# Patient Record
Sex: MALE | Race: WHITE | Employment: FULL TIME | URBAN - METROPOLITAN AREA
[De-identification: names, ages, dates, MRNs, and addresses within clinical notes are randomized per-mention and may not be internally consistent; named-entity substitution may affect disease eponyms.]

---

## 2019-04-16 ENCOUNTER — HOSPITAL ENCOUNTER (OUTPATIENT)
Age: 54
Discharge: HOME | End: 2019-04-16
Payer: COMMERCIAL

## 2019-04-16 LAB
ALBUMIN SERPL-MCNC: 4.1 G/DL (ref 3.5–5.2)
ALP BLD-CCNC: 53 U/L (ref 40–129)
ALT SERPL-CCNC: 18 U/L (ref 0–40)
ANION GAP SERPL CALCULATED.3IONS-SCNC: 10 MMOL/L (ref 7–16)
AST SERPL-CCNC: 22 U/L (ref 0–39)
BASOPHILS ABSOLUTE: 0.05 E9/L (ref 0–0.2)
BASOPHILS RELATIVE PERCENT: 1.1 % (ref 0–2)
BILIRUB SERPL-MCNC: 0.7 MG/DL (ref 0–1.2)
BUN BLDV-MCNC: 20 MG/DL (ref 6–20)
CALCIUM SERPL-MCNC: 9.4 MG/DL (ref 8.6–10.2)
CEA: 1.9 NG/ML (ref 0–5.2)
CHLORIDE BLD-SCNC: 110 MMOL/L (ref 98–107)
CHOLESTEROL, FASTING: 211 MG/DL (ref 0–199)
CO2: 26 MMOL/L (ref 22–29)
CREAT SERPL-MCNC: 1.1 MG/DL (ref 0.7–1.2)
EOSINOPHILS ABSOLUTE: 0.19 E9/L (ref 0.05–0.5)
EOSINOPHILS RELATIVE PERCENT: 4.3 % (ref 0–6)
GFR AFRICAN AMERICAN: >60
GFR NON-AFRICAN AMERICAN: >60 ML/MIN/1.73
GLUCOSE FASTING: 104 MG/DL (ref 74–99)
HCT VFR BLD CALC: 45.1 % (ref 37–54)
HDLC SERPL-MCNC: 70 MG/DL
HEMOGLOBIN: 14.8 G/DL (ref 12.5–16.5)
IMMATURE GRANULOCYTES #: 0.02 E9/L
IMMATURE GRANULOCYTES %: 0.4 % (ref 0–5)
LDL CHOLESTEROL CALCULATED: 123 MG/DL (ref 0–99)
LYMPHOCYTES ABSOLUTE: 1.72 E9/L (ref 1.5–4)
LYMPHOCYTES RELATIVE PERCENT: 38.5 % (ref 20–42)
MCH RBC QN AUTO: 31.4 PG (ref 26–35)
MCHC RBC AUTO-ENTMCNC: 32.8 % (ref 32–34.5)
MCV RBC AUTO: 95.8 FL (ref 80–99.9)
MONOCYTES ABSOLUTE: 0.61 E9/L (ref 0.1–0.95)
MONOCYTES RELATIVE PERCENT: 13.6 % (ref 2–12)
NEUTROPHILS ABSOLUTE: 1.88 E9/L (ref 1.8–7.3)
NEUTROPHILS RELATIVE PERCENT: 42.1 % (ref 43–80)
PDW BLD-RTO: 14 FL (ref 11.5–15)
PLATELET # BLD: 197 E9/L (ref 130–450)
PMV BLD AUTO: 9.8 FL (ref 7–12)
POTASSIUM SERPL-SCNC: 4.6 MMOL/L (ref 3.5–5)
PROSTATE SPECIFIC ANTIGEN: 0.34 NG/ML (ref 0–4)
RBC # BLD: 4.71 E12/L (ref 3.8–5.8)
SODIUM BLD-SCNC: 146 MMOL/L (ref 132–146)
T4 TOTAL: 4.3 MCG/DL (ref 4.5–11.7)
TESTOSTERONE TOTAL: 362.8 NG/DL
TOTAL PROTEIN: 6.4 G/DL (ref 6.4–8.3)
TRIGLYCERIDE, FASTING: 91 MG/DL (ref 0–149)
TSH SERPL DL<=0.05 MIU/L-ACNC: 3.25 UIU/ML (ref 0.27–4.2)
VLDLC SERPL CALC-MCNC: 18 MG/DL
WBC # BLD: 4.5 E9/L (ref 4.5–11.5)

## 2019-04-16 PROCEDURE — 80053 COMPREHEN METABOLIC PANEL: CPT

## 2019-04-16 PROCEDURE — 84436 ASSAY OF TOTAL THYROXINE: CPT

## 2019-04-16 PROCEDURE — 85025 COMPLETE CBC W/AUTO DIFF WBC: CPT

## 2019-04-16 PROCEDURE — G0103 PSA SCREENING: HCPCS

## 2019-04-16 PROCEDURE — 84403 ASSAY OF TOTAL TESTOSTERONE: CPT

## 2019-04-16 PROCEDURE — 82378 CARCINOEMBRYONIC ANTIGEN: CPT

## 2019-04-16 PROCEDURE — 80061 LIPID PANEL: CPT

## 2019-04-16 PROCEDURE — 84443 ASSAY THYROID STIM HORMONE: CPT

## 2019-04-16 PROCEDURE — 36415 COLL VENOUS BLD VENIPUNCTURE: CPT

## 2019-04-23 ENCOUNTER — TELEPHONE (OUTPATIENT)
Dept: CARDIOLOGY CLINIC | Age: 54
End: 2019-04-23

## 2019-05-03 ENCOUNTER — OFFICE VISIT (OUTPATIENT)
Dept: CARDIOLOGY CLINIC | Age: 54
End: 2019-05-03
Payer: COMMERCIAL

## 2019-05-03 VITALS
DIASTOLIC BLOOD PRESSURE: 70 MMHG | SYSTOLIC BLOOD PRESSURE: 110 MMHG | HEIGHT: 74 IN | WEIGHT: 220 LBS | HEART RATE: 60 BPM | BODY MASS INDEX: 28.23 KG/M2

## 2019-05-03 DIAGNOSIS — Z82.49 FAMILY HISTORY OF EARLY CAD: ICD-10-CM

## 2019-05-03 DIAGNOSIS — E66.3 OVER WEIGHT: ICD-10-CM

## 2019-05-03 DIAGNOSIS — Z72.0 TOBACCO ABUSE: ICD-10-CM

## 2019-05-03 DIAGNOSIS — I10 ESSENTIAL HYPERTENSION: ICD-10-CM

## 2019-05-03 DIAGNOSIS — R07.9 CHEST PAIN, UNSPECIFIED TYPE: Primary | ICD-10-CM

## 2019-05-03 PROCEDURE — 99204 OFFICE O/P NEW MOD 45 MIN: CPT | Performed by: INTERNAL MEDICINE

## 2019-05-03 PROCEDURE — 93000 ELECTROCARDIOGRAM COMPLETE: CPT | Performed by: INTERNAL MEDICINE

## 2019-05-03 RX ORDER — ESCITALOPRAM OXALATE 20 MG/1
20 TABLET ORAL DAILY
Refills: 1 | COMMUNITY
Start: 2019-03-31

## 2019-05-03 NOTE — PROGRESS NOTES
OFFICE VISIT     PRIMARY CARE PHYSICIAN:      Pricila Matias DO       ALLERGIES / SENSITIVITIES:        Allergies   Allergen Reactions    Bee Venom           REVIEWED MEDICATIONS:        Current Outpatient Medications:     escitalopram (LEXAPRO) 20 MG tablet, Take 20 mg by mouth daily , Disp: , Rfl: 1    olmesartan (BENICAR) 20 MG tablet, Take 40 mg by mouth daily , Disp: , Rfl:       S: REASON FOR VISIT:       Chief Complaint   Patient presents with    Chest Pain     Consult per Dr. Arturo Paul due to Chest pain. Pt complains chest pain was after eating. No other cardiac complaints today          History of Present Illness:       New consult for chest apin   C/o on and off lower sternal pain, after eating ' like disconfort\", no radiation, no associated Sx, relieve on its own   No hospitalizations or surgeries recently   Yunier any exertional chest pain or short of breath   No palpitations, dizzy or syncope.    Active at home   No orthopnea   Try to watch diet   Compliant with all medications       Past Medical History:   Diagnosis Date    Hypertension     Kidney stones             Past Surgical History:   Procedure Laterality Date    KIDNEY STONE SURGERY            Family History   Problem Relation Age of Onset    Cancer Mother     Heart Attack Father           Social History     Tobacco Use    Smoking status: Current Every Day Smoker     Packs/day: 1.00    Smokeless tobacco: Never Used   Substance Use Topics    Alcohol use: No         Review of Systems:  HEENT: negative for acute visual symptoms or auditory problems, no dysphagia  Constitutional: negative for fever and chills, or significant weight loss  Respiratory: negative for cough, wheezing, or hemoptysis  Cardiovascular: negative for chest pain, palpitations, and dyspnea  Gastrointestinal: negative for abdominal pain, diarrhea, nausea and vomiting  Endocrine: Negative for polyuria and polydyspsia  Genitourinary:negative for dysuria and hematuria  Derm: negative for rash and skin lesion(s)  Neurological: negative for tingling, numbness, weakness, seizures and tremors  Endocrine: negative for polydipsia and polyuria  Musculoskeletal: negative for pain or tenderness  Psychiatric: negative for anxiety, depression, or suicidal ideations         O:  COMPLETE PHYSICAL EXAM:       /70   Pulse 60   Ht 6' 2\" (1.88 m)   Wt 220 lb (99.8 kg)   BMI 28.25 kg/m²       General:   Patient alert, comfortable, no distress. Appears stated age. HEENT:    Pupils equal, no icterus, no nasal drainage, tongue moist.   Neck:              No masses, Thyroid not palpable. Chest:   Normal configuration, non tender. Lungs:   Clear to auscultation bilaterally, few scattered rhonchi. Cardiovascular:  Regular rhythm, 1/6 systolic murmur, No S3, PMI at 5th ICS, no palpable thrills, No elevated JVD, No carotid bruit. Abdomen:  Soft, Non tender, Bowel sounds normal, no pulsatile abdominal aorta, no palpable masses. Extremities:  No edema. Distal pulses palpable. No cyanosis, no clubbing. Skin:   Good turgor, warm and dry, no cyanosis. Musculoskeletal: No joint swelling or deformity. Neuro:   Cranial nerves grossly intact; No focal neurologic deficit. Psych:   Alert, good mood and effect. REVIEW OF DIAGNOSTIC TESTS:        Electrocardiogram: Reviewed             A/P:   ASSESSMENT / PLAN:    Natalie Ryan was seen today for chest pain. Diagnoses and all orders for this visit:    Chest pain, unspecified type - Atypical  -     EKG 12 Lead  -     CARDIAC STRESS TEST EXERCISE ONLY; Future    Over weight - Diet, exercise and weight loss discussed. Essential hypertension - Stable  -     CARDIAC STRESS TEST EXERCISE ONLY; Future    Family history of early CAD  -     CARDIAC STRESS TEST EXERCISE ONLY; Future    Tobacco use - Counseled to quit smoking     Preventive Cardiology: Low cholesterol diet, regular exercise as tolerate, and gradual weight loss discussed.      Monitor BP and heart rates. D/w his wife   All questions answered about cardiac diagnoses and cardiac medications. Continue current medications. Compliance with medications and f/u with all physicians discussed. Risk factor modification based on risk profile discussed. Call if any exertional chest pain, short of breath, dizzy or palpitations   Follow up in 12months or earlier if needed.          [facility]  [address], [place], [address] [facility]  [phone]

## 2019-05-21 ENCOUNTER — HOSPITAL ENCOUNTER (OUTPATIENT)
Dept: CARDIOLOGY | Age: 54
Discharge: HOME | End: 2019-05-21
Payer: COMMERCIAL

## 2019-05-21 VITALS
DIASTOLIC BLOOD PRESSURE: 60 MMHG | OXYGEN SATURATION: 99 % | SYSTOLIC BLOOD PRESSURE: 130 MMHG | HEIGHT: 74 IN | HEART RATE: 86 BPM | WEIGHT: 222 LBS | BODY MASS INDEX: 28.49 KG/M2

## 2019-05-21 DIAGNOSIS — R07.9 CHEST PAIN, UNSPECIFIED TYPE: ICD-10-CM

## 2019-05-21 DIAGNOSIS — Z82.49 FAMILY HISTORY OF EARLY CAD: ICD-10-CM

## 2019-05-21 DIAGNOSIS — I10 ESSENTIAL HYPERTENSION: ICD-10-CM

## 2019-05-21 PROCEDURE — 93017 CV STRESS TEST TRACING ONLY: CPT

## 2020-06-03 ENCOUNTER — TELEPHONE (OUTPATIENT)
Dept: CARDIOLOGY CLINIC | Age: 55
End: 2020-06-03

## 2022-07-16 ENCOUNTER — HOSPITAL ENCOUNTER (OUTPATIENT)
Age: 57
Discharge: HOME | End: 2022-07-16
Payer: COMMERCIAL

## 2022-07-16 LAB
ALBUMIN SERPL-MCNC: 4.6 G/DL (ref 3.5–5.2)
ALP BLD-CCNC: 68 U/L (ref 40–129)
ALT SERPL-CCNC: 19 U/L (ref 0–40)
ANION GAP SERPL CALCULATED.3IONS-SCNC: 9 MMOL/L (ref 7–16)
AST SERPL-CCNC: 17 U/L (ref 0–39)
BASOPHILS ABSOLUTE: 0.06 E9/L (ref 0–0.2)
BASOPHILS RELATIVE PERCENT: 1.2 % (ref 0–2)
BILIRUB SERPL-MCNC: 0.8 MG/DL (ref 0–1.2)
BUN BLDV-MCNC: 22 MG/DL (ref 6–20)
CALCIUM SERPL-MCNC: 9.2 MG/DL (ref 8.6–10.2)
CEA: 2.4 NG/ML (ref 0–5.2)
CHLORIDE BLD-SCNC: 103 MMOL/L (ref 98–107)
CHOLESTEROL, TOTAL: 229 MG/DL (ref 0–199)
CO2: 25 MMOL/L (ref 22–29)
CREAT SERPL-MCNC: 1.1 MG/DL (ref 0.7–1.2)
EOSINOPHILS ABSOLUTE: 0.18 E9/L (ref 0.05–0.5)
EOSINOPHILS RELATIVE PERCENT: 3.7 % (ref 0–6)
GFR AFRICAN AMERICAN: >60
GFR NON-AFRICAN AMERICAN: >60 ML/MIN/1.73
GLUCOSE BLD-MCNC: 99 MG/DL (ref 74–99)
HCT VFR BLD CALC: 48.5 % (ref 37–54)
HDLC SERPL-MCNC: 65 MG/DL
HEMOGLOBIN: 15.9 G/DL (ref 12.5–16.5)
IMMATURE GRANULOCYTES #: 0.02 E9/L
IMMATURE GRANULOCYTES %: 0.4 % (ref 0–5)
LDL CHOLESTEROL CALCULATED: 139 MG/DL (ref 0–99)
LYMPHOCYTES ABSOLUTE: 1.8 E9/L (ref 1.5–4)
LYMPHOCYTES RELATIVE PERCENT: 37.4 % (ref 20–42)
MCH RBC QN AUTO: 31.4 PG (ref 26–35)
MCHC RBC AUTO-ENTMCNC: 32.8 % (ref 32–34.5)
MCV RBC AUTO: 95.7 FL (ref 80–99.9)
MONOCYTES ABSOLUTE: 0.55 E9/L (ref 0.1–0.95)
MONOCYTES RELATIVE PERCENT: 11.4 % (ref 2–12)
NEUTROPHILS ABSOLUTE: 2.2 E9/L (ref 1.8–7.3)
NEUTROPHILS RELATIVE PERCENT: 45.9 % (ref 43–80)
PDW BLD-RTO: 13.8 FL (ref 11.5–15)
PLATELET # BLD: 209 E9/L (ref 130–450)
PMV BLD AUTO: 9.5 FL (ref 7–12)
POTASSIUM SERPL-SCNC: 4 MMOL/L (ref 3.5–5)
PROSTATE SPECIFIC ANTIGEN: 0.31 NG/ML (ref 0–4)
RBC # BLD: 5.07 E12/L (ref 3.8–5.8)
SODIUM BLD-SCNC: 137 MMOL/L (ref 132–146)
T4 TOTAL: 4.7 MCG/DL (ref 4.5–11.7)
TOTAL PROTEIN: 7.2 G/DL (ref 6.4–8.3)
TRIGL SERPL-MCNC: 126 MG/DL (ref 0–149)
TSH SERPL DL<=0.05 MIU/L-ACNC: 1.59 UIU/ML (ref 0.27–4.2)
VLDLC SERPL CALC-MCNC: 25 MG/DL
WBC # BLD: 4.8 E9/L (ref 4.5–11.5)

## 2022-07-16 PROCEDURE — 84443 ASSAY THYROID STIM HORMONE: CPT

## 2022-07-16 PROCEDURE — 82378 CARCINOEMBRYONIC ANTIGEN: CPT

## 2022-07-16 PROCEDURE — 85025 COMPLETE CBC W/AUTO DIFF WBC: CPT

## 2022-07-16 PROCEDURE — 80061 LIPID PANEL: CPT

## 2022-07-16 PROCEDURE — 80053 COMPREHEN METABOLIC PANEL: CPT

## 2022-07-16 PROCEDURE — 84436 ASSAY OF TOTAL THYROXINE: CPT

## 2022-07-16 PROCEDURE — 36415 COLL VENOUS BLD VENIPUNCTURE: CPT

## 2022-07-16 PROCEDURE — G0103 PSA SCREENING: HCPCS

## 2023-11-30 ENCOUNTER — TRANSCRIBE ORDERS (OUTPATIENT)
Age: 58
End: 2023-11-30

## 2023-11-30 DIAGNOSIS — R01.1 HEART MURMUR: Primary | ICD-10-CM

## 2023-12-01 ENCOUNTER — HOSPITAL ENCOUNTER (OUTPATIENT)
Age: 58
Discharge: HOME | End: 2023-12-01
Payer: COMMERCIAL

## 2023-12-01 VITALS — BODY MASS INDEX: 28.49 KG/M2 | HEIGHT: 74 IN | WEIGHT: 222 LBS

## 2023-12-01 DIAGNOSIS — R01.1 HEART MURMUR: ICD-10-CM

## 2023-12-01 LAB
CHOLEST SERPL-MCNC: 147 MG/DL
HDLC SERPL-MCNC: 73 MG/DL
LDLC SERPL CALC-MCNC: 58 MG/DL
TRIGL SERPL-MCNC: 78 MG/DL
VLDLC SERPL CALC-MCNC: 16 MG/DL

## 2023-12-01 PROCEDURE — 36415 COLL VENOUS BLD VENIPUNCTURE: CPT

## 2023-12-01 PROCEDURE — 93306 TTE W/DOPPLER COMPLETE: CPT

## 2023-12-01 PROCEDURE — 80061 LIPID PANEL: CPT

## 2023-12-02 LAB
ECHO AR MAX VEL PISA: 3 M/S
ECHO AV AREA PEAK VELOCITY: 2.9 CM2
ECHO AV AREA PEAK VELOCITY: 3 CM2
ECHO AV AREA PEAK VELOCITY: 3.7 CM2
ECHO AV AREA PEAK VELOCITY: 3.8 CM2
ECHO AV AREA VTI: 3.3 CM2
ECHO AV AREA/BSA VTI: 1.5 CM2/M2
ECHO AV CUSP MM: 3 CM
ECHO AV MEAN GRADIENT: 6 MMHG
ECHO AV MEAN VELOCITY: 1.2 M/S
ECHO AV PEAK GRADIENT: 10 MMHG
ECHO AV PEAK GRADIENT: 11 MMHG
ECHO AV PEAK VELOCITY: 1.6 M/S
ECHO AV PEAK VELOCITY: 1.6 M/S
ECHO AV REGURGITANT PHT: 1294.7 MILLISECOND
ECHO AV VTI: 36.7 CM
ECHO BSA: 2.29 M2
ECHO EST RA PRESSURE: 3 MMHG
ECHO LA DIAMETER INDEX: 1.63 CM/M2
ECHO LA DIAMETER: 3.7 CM
ECHO LA VOL A-L A2C: 55 ML (ref 18–58)
ECHO LA VOL A-L A4C: 57 ML (ref 18–58)
ECHO LA VOL MOD A2C: 52 ML (ref 18–58)
ECHO LA VOL MOD A4C: 53 ML (ref 18–58)
ECHO LA VOLUME AREA LENGTH: 62 ML
ECHO LA VOLUME INDEX A-L A2C: 24 ML/M2 (ref 16–34)
ECHO LA VOLUME INDEX A-L A4C: 25 ML/M2 (ref 16–34)
ECHO LA VOLUME INDEX AREA LENGTH: 27 ML/M2 (ref 16–34)
ECHO LA VOLUME INDEX MOD A2C: 23 ML/M2 (ref 16–34)
ECHO LA VOLUME INDEX MOD A4C: 23 ML/M2 (ref 16–34)
ECHO LV FRACTIONAL SHORTENING: 42 % (ref 28–44)
ECHO LV INTERNAL DIMENSION DIASTOLE INDEX: 2.29 CM/M2
ECHO LV INTERNAL DIMENSION DIASTOLIC: 5.2 CM (ref 4.2–5.9)
ECHO LV INTERNAL DIMENSION SYSTOLIC INDEX: 1.32 CM/M2
ECHO LV INTERNAL DIMENSION SYSTOLIC: 3 CM
ECHO LV IVSD: 1.5 CM (ref 0.6–1)
ECHO LV IVSS: 1.8 CM
ECHO LV MASS 2D: 342.4 G (ref 88–224)
ECHO LV MASS INDEX 2D: 150.8 G/M2 (ref 49–115)
ECHO LV POSTERIOR WALL DIASTOLIC: 1.5 CM (ref 0.6–1)
ECHO LV POSTERIOR WALL SYSTOLIC: 1.6 CM
ECHO LV RELATIVE WALL THICKNESS RATIO: 0.58
ECHO LVOT AREA: 3.1 CM2
ECHO LVOT AV VTI INDEX: 1.06
ECHO LVOT DIAM: 2 CM
ECHO LVOT MEAN GRADIENT: 9 MMHG
ECHO LVOT PEAK GRADIENT: 15 MMHG
ECHO LVOT PEAK GRADIENT: 9 MMHG
ECHO LVOT PEAK VELOCITY: 1.5 M/S
ECHO LVOT PEAK VELOCITY: 1.9 M/S
ECHO LVOT STROKE VOLUME INDEX: 53.8 ML/M2
ECHO LVOT SV: 122.1 ML
ECHO LVOT VTI: 38.9 CM
ECHO MV "A" WAVE DURATION: 175.3 MSEC
ECHO MV A VELOCITY: 0.66 M/S
ECHO MV AREA PHT: 2 CM2
ECHO MV AREA VTI: 5.6 CM2
ECHO MV E DECELERATION TIME (DT): 286.5 MS
ECHO MV E VELOCITY: 0.49 M/S
ECHO MV E/A RATIO: 0.74
ECHO MV LVOT VTI INDEX: 0.57
ECHO MV MAX VELOCITY: 0.7 M/S
ECHO MV MEAN GRADIENT: 1 MMHG
ECHO MV MEAN VELOCITY: 0.4 M/S
ECHO MV PEAK GRADIENT: 2 MMHG
ECHO MV PRESSURE HALF TIME (PHT): 110 MS
ECHO MV VTI: 22 CM
ECHO PV MAX VELOCITY: 1 M/S
ECHO PV MEAN GRADIENT: 3 MMHG
ECHO PV MEAN VELOCITY: 0.9 M/S
ECHO PV PEAK GRADIENT: 4 MMHG
ECHO PV VTI: 27.4 CM
ECHO PVEIN A DURATION: 147.6 MS
ECHO PVEIN A VELOCITY: 0.3 M/S
ECHO PVEIN PEAK D VELOCITY: 0.4 M/S
ECHO PVEIN PEAK S VELOCITY: 0.5 M/S
ECHO PVEIN S/D RATIO: 1.3
ECHO RIGHT VENTRICULAR SYSTOLIC PRESSURE (RVSP): 13 MMHG
ECHO RV INTERNAL DIMENSION: 2.9 CM
ECHO RVOT PEAK GRADIENT: 3 MMHG
ECHO RVOT PEAK VELOCITY: 0.8 M/S
ECHO TV REGURGITANT MAX VELOCITY: 1.57 M/S
ECHO TV REGURGITANT PEAK GRADIENT: 10 MMHG

## 2023-12-02 PROCEDURE — 93306 TTE W/DOPPLER COMPLETE: CPT | Performed by: INTERNAL MEDICINE
